# Patient Record
Sex: FEMALE | URBAN - METROPOLITAN AREA
[De-identification: names, ages, dates, MRNs, and addresses within clinical notes are randomized per-mention and may not be internally consistent; named-entity substitution may affect disease eponyms.]

---

## 2017-04-02 ENCOUNTER — EMERGENCY (EMERGENCY)
Facility: HOSPITAL | Age: 57
LOS: 1 days | Discharge: HOME | End: 2017-04-02
Attending: INTERNAL MEDICINE | Admitting: INTERNAL MEDICINE
Payer: MEDICARE

## 2017-04-02 VITALS
TEMPERATURE: 98 F | RESPIRATION RATE: 18 BRPM | DIASTOLIC BLOOD PRESSURE: 88 MMHG | HEART RATE: 78 BPM | SYSTOLIC BLOOD PRESSURE: 130 MMHG | OXYGEN SATURATION: 98 %

## 2017-04-02 VITALS
SYSTOLIC BLOOD PRESSURE: 129 MMHG | TEMPERATURE: 98 F | HEIGHT: 58 IN | HEART RATE: 84 BPM | WEIGHT: 186.95 LBS | DIASTOLIC BLOOD PRESSURE: 91 MMHG | RESPIRATION RATE: 18 BRPM | OXYGEN SATURATION: 95 %

## 2017-04-02 DIAGNOSIS — R93.8 ABNORMAL FINDINGS ON DIAGNOSTIC IMAGING OF OTHER SPECIFIED BODY STRUCTURES: ICD-10-CM

## 2017-04-02 DIAGNOSIS — E78.5 HYPERLIPIDEMIA, UNSPECIFIED: ICD-10-CM

## 2017-04-02 DIAGNOSIS — J32.9 CHRONIC SINUSITIS, UNSPECIFIED: ICD-10-CM

## 2017-04-02 DIAGNOSIS — I10 ESSENTIAL (PRIMARY) HYPERTENSION: ICD-10-CM

## 2017-04-02 DIAGNOSIS — E11.9 TYPE 2 DIABETES MELLITUS WITHOUT COMPLICATIONS: ICD-10-CM

## 2017-04-02 DIAGNOSIS — E03.9 HYPOTHYROIDISM, UNSPECIFIED: ICD-10-CM

## 2017-04-02 PROCEDURE — 71046 X-RAY EXAM CHEST 2 VIEWS: CPT

## 2017-04-02 PROCEDURE — 71020: CPT | Mod: 26

## 2017-04-02 PROCEDURE — 99283 EMERGENCY DEPT VISIT LOW MDM: CPT | Mod: 25

## 2017-04-02 PROCEDURE — 99283 EMERGENCY DEPT VISIT LOW MDM: CPT

## 2017-04-02 RX ADMIN — Medication 60 MILLIGRAM(S): at 06:04

## 2017-04-02 NOTE — ED ADULT NURSE NOTE - DISCHARGE TEACHING
Dr Person educated patient to stop Ceftin and start taking Levaquin, patient verbalized understanding.

## 2017-04-02 NOTE — ED ADULT NURSE REASSESSMENT NOTE - NS ED NURSE REASSESS COMMENT FT1
patient states "I feel better." patient denies shortness of breath, chest pain, dizziness, nausea.  respirations even and unlabored. b/l lungs clear.

## 2017-04-02 NOTE — ED ADULT NURSE NOTE - OBJECTIVE STATEMENT
57yo female presents to ED alert and oriented X4. steady gait noted. patient c/o intermittent cough. patient is currently being treated for sinus infection and is on Ceftin. patient denies fever, chills, nausea, vomiting, diarrhea, chest pain, shortness of breath. respirations even and unlabored. b/l lungs clear.  family at bedside.

## 2017-04-02 NOTE — ED PROVIDER NOTE - CARE PLAN
Principal Discharge DX:	Sinus infection  Secondary Diagnosis:	Cough Principal Discharge DX:	Sinus infection  Secondary Diagnosis:	Infiltrate noted on imaging study

## 2017-04-27 RX ORDER — CARVEDILOL PHOSPHATE 80 MG/1
0 CAPSULE, EXTENDED RELEASE ORAL
Qty: 0 | Refills: 0 | COMMUNITY

## 2017-04-27 RX ORDER — CEFUROXIME AXETIL 250 MG
0 TABLET ORAL
Qty: 0 | Refills: 0 | COMMUNITY

## 2017-04-27 RX ORDER — GLIMEPIRIDE 1 MG
0 TABLET ORAL
Qty: 0 | Refills: 0 | COMMUNITY

## 2017-04-27 RX ORDER — METFORMIN HYDROCHLORIDE 850 MG/1
0 TABLET ORAL
Qty: 0 | Refills: 0 | COMMUNITY

## 2017-04-27 RX ORDER — ATORVASTATIN CALCIUM 80 MG/1
0 TABLET, FILM COATED ORAL
Qty: 0 | Refills: 0 | COMMUNITY

## 2017-04-27 RX ORDER — LEVOTHYROXINE SODIUM 125 MCG
0 TABLET ORAL
Qty: 0 | Refills: 0 | COMMUNITY

## 2017-04-27 RX ORDER — RISPERIDONE 4 MG/1
0 TABLET ORAL
Qty: 0 | Refills: 0 | COMMUNITY

## 2017-04-27 RX ORDER — LITHIUM CARBONATE 300 MG/1
0 TABLET, EXTENDED RELEASE ORAL
Qty: 0 | Refills: 0 | COMMUNITY

## 2020-02-10 NOTE — ED ADULT NURSE NOTE - BREATHING, MLM
From: Kolby Cabello  To: Bhavesh Cherry MD  Sent: 2/9/2020 8:45 AM CST  Subject: Medication Question    Please look into my Fridays visit to the ER. I had been taken in by ambulance after passing out from low blood sugar. I normally feel it coming on but this time I had no idea. I had two cups of coffee, two large glasses of water, omelet ( cheese, sausage, ham) and two pieces of white toast with butter for breakfast. This was at 8:30 am. I took my medications as directed. I remember texting one of my friends that I alyse be over in a bit to help him with his car. I do not remember driving over to his house or working on his car. I worked on his car for about an hour before he said I started acting very delusional. He called the ambulance and I was taken in to the ER. I do not remember any of this. I felt off when I woke up in the morning. I was very tired and just wanted to lay down and sleep.     Saturday morning my glucose level was 92. Breakfast consisted of a smoothie consisted of bananas, strawberries, raspberries, blueberries, honey, Greek yogurt and oatmeal. This was at 9:15 am. At 11:00 am my glucose level was 224.   For lunch I had a hamburger david with a piece of butter and ketchup. Later afternoon I had sindhu cracker sticks. Tested glucose at 5:00 pm before dinner 190. For dinner was a mixed green salad with [place] dressing. I had 3 summer sausage and crackers along with that. I tested before going to bed 8:45 pm 212.    I had lowered my insulin back to 22 parts following the ER doctors recommendations.    Please advise.   Spontaneous, unlabored and symmetrical